# Patient Record
Sex: MALE | Race: WHITE | URBAN - METROPOLITAN AREA
[De-identification: names, ages, dates, MRNs, and addresses within clinical notes are randomized per-mention and may not be internally consistent; named-entity substitution may affect disease eponyms.]

---

## 2022-03-15 ENCOUNTER — EMERGENCY (EMERGENCY)
Facility: HOSPITAL | Age: 27
LOS: 0 days | Discharge: HOME | End: 2022-03-15
Attending: EMERGENCY MEDICINE | Admitting: EMERGENCY MEDICINE
Payer: COMMERCIAL

## 2022-03-15 VITALS
RESPIRATION RATE: 20 BRPM | DIASTOLIC BLOOD PRESSURE: 82 MMHG | OXYGEN SATURATION: 100 % | HEART RATE: 97 BPM | TEMPERATURE: 98 F | SYSTOLIC BLOOD PRESSURE: 139 MMHG

## 2022-03-15 DIAGNOSIS — M54.2 CERVICALGIA: ICD-10-CM
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
## 2022-03-15 DIAGNOSIS — M54.9 DORSALGIA, UNSPECIFIED: ICD-10-CM

## 2022-03-15 DIAGNOSIS — F17.200 NICOTINE DEPENDENCE, UNSPECIFIED, UNCOMPLICATED: ICD-10-CM
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
## 2022-03-15 PROCEDURE — 99284 EMERGENCY DEPT VISIT MOD MDM: CPT

## 2022-03-15 PROCEDURE — 71046 X-RAY EXAM CHEST 2 VIEWS: CPT | Mod: 26

## 2022-03-15 RX ORDER — KETOROLAC TROMETHAMINE 30 MG/ML
15 SYRINGE (ML) INJECTION ONCE
Refills: 0 | Status: DISCONTINUED | OUTPATIENT
Start: 2022-03-15 | End: 2022-03-15

## 2022-03-15 RX ORDER — METHOCARBAMOL 500 MG/1
1000 TABLET, FILM COATED ORAL ONCE
Refills: 0 | Status: COMPLETED | OUTPATIENT
Start: 2022-03-15 | End: 2022-03-15

## 2022-03-15 RX ORDER — METHOCARBAMOL 500 MG/1
2 TABLET, FILM COATED ORAL
Qty: 14 | Refills: 0
Start: 2022-03-15 | End: 2022-03-21

## 2022-03-15 RX ORDER — IBUPROFEN 200 MG
1 TABLET ORAL
Qty: 21 | Refills: 0
Start: 2022-03-15 | End: 2022-03-21

## 2022-03-15 RX ADMIN — Medication 15 MILLIGRAM(S): at 12:22

## 2022-03-15 RX ADMIN — METHOCARBAMOL 1000 MILLIGRAM(S): 500 TABLET, FILM COATED ORAL at 12:23

## 2022-03-15 NOTE — ED PROVIDER NOTE - PATIENT PORTAL LINK FT
You can access the FollowMyHealth Patient Portal offered by [facility] by registering at the following website: [email or web address]. By joining [facility]’s FollowMyHealth portal, you will also be able to view your health information using other applications (apps) compatible with our system.
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
## 2022-03-15 NOTE — ED PROVIDER NOTE - NSFOLLOWUPCLINICS_GEN_ALL_ED_FT
[facility] Concussion Program  Concussion Program  [address] [place] [address]   Phone: [phone]  Fax:   Follow Up Time: 1-3 Days

## 2022-03-15 NOTE — ED PROVIDER NOTE - PROGRESS NOTE DETAILS
[place]: Long discussion had with pt re: "neck and back xrays" and CT of head. Discussed exam findings, utility of imaging for muscle strain, likelihood of having bleeding, and concussion follow up. Pt adamant that he prefers to have imaging "just to know for sure."

## 2022-03-15 NOTE — ED PROVIDER NOTE - CLINICAL SUMMARY MEDICAL DECISION MAKING FREE TEXT BOX
No distress.  VSS.  No head trauma.  No midline C-spine tenderness.  Neuro exam normal.  CXR negative. Expectant management.  Rest, NSAIDs, muscle relaxers and d/c home.  Strict return instructions discussed.

## 2022-03-15 NOTE — ED PROVIDER NOTE - NS ED ROS FT
Eyes:  No visual changes, eye pain or discharge.  ENMT:  No hearing changes, pain, no sore throat or runny nose, no difficulty swallowing  Cardiac:  No chest pain, SOB or edema. No chest pain with exertion.  Respiratory:  No cough or respiratory distress.   GI:  No nausea, vomiting, diarrhea or abdominal pain.  :  No dysuria, frequency or burning.  MS: +neck pain, back pain. No myalgia, muscle weakness, joint pain  Neuro:  No headache or weakness.  No LOC.  Skin:  No skin rash.   Endocrine: No history of thyroid disease or diabetes.

## 2022-03-15 NOTE — ED PROVIDER NOTE - PHYSICAL EXAMINATION
CONSTITUTIONAL: Well-developed; well-nourished; in no acute distress.   SKIN: Warm, dry  HEAD: Normocephalic; atraumatic  EYES: PERRL, EOMI, normal sclera and conjunctiva   ENT: No nasal discharge; airway clear.  NECK: Supple; non tender.  CARD:  Regular rate and rhythm. Normal S1, S2  RESP: No increased WOB. CTA b/l without wheezes, crackles, rhonchi  ABD: Normoactive BS. Soft, nontender, nondistended.  EXT: Normal ROM.   LYMPH: No acute cervical adenopathy.  NEURO: Alert, oriented, grossly unremarkable. No midline spinal tenderness C/T/L. paraspinal muscular tenderness cervical spine and upper thoracic. 5/5 strength b/l upper and lower extremities. Sensation intact.   PSYCH: Cooperative, appropriate.
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
## 2025-05-19 NOTE — ED ADULT NURSE NOTE - SUICIDE SCREENING QUESTION 2
Sore Throat relief:  Salt water gargles throughout the day for sore throat symptoms if age appropriate  Take OTC medication as needed for pain/fever such as acetaminophen or ibuprofen if appropriate  Increase fluid intake  Throat lozenges and/or chloraseptic throat spray for sore throat    Cough & Congestion Relief:  Saline nasal sprays can relieve nasal congestion and dryness  Humidifier and vaporizers work equally well to add moisture to the air, which helps ease coughing and congestion. Mucus tends to pull in the extra moisture, which thins it out and make the mucus easier to expel for the body. Cool mist humidifiers are safe because they don't contain hot water or steam, but some patient prefer warm vaporizer so they can add menthol inhalants to the steam. Remember to replace the water in humidifiers and vaporizers daily in order to prevent the growth of bacteria and mold. This is very important.   Warm Steam Showers/Baths   Topical antitussives (Vicks Vaporub, Mentholatum, etc.) contain a mixture of camphor, menthol, and eucalyptus for cough and congestion. Inhaling the oils creates a local anesthetic sensation and a sense of improved airflow. Please, remember to apply the run sparingly and then cover it up with a shirt.  Honey can relieve cough by increase saliva, which coats the larynx and relieve irritation. We suggest using .5 - 2 teaspoons. Honey is not reccommended for infants under 1 year because they lack the stomach gab to protect against botulism.   Lots of liquids-helps thin secretions and maintain hydration     No